# Patient Record
Sex: MALE | Race: WHITE | NOT HISPANIC OR LATINO | Employment: OTHER | URBAN - NONMETROPOLITAN AREA
[De-identification: names, ages, dates, MRNs, and addresses within clinical notes are randomized per-mention and may not be internally consistent; named-entity substitution may affect disease eponyms.]

---

## 2022-07-14 PROBLEM — I25.10 CORONARY ARTERY CALCIFICATION: Status: ACTIVE | Noted: 2022-07-14

## 2022-07-14 PROBLEM — I25.84 CORONARY ARTERY CALCIFICATION: Status: ACTIVE | Noted: 2022-07-14

## 2022-07-14 PROBLEM — E78.5 HYPERLIPIDEMIA LDL GOAL <100: Status: ACTIVE | Noted: 2022-07-14

## 2022-07-14 NOTE — PROGRESS NOTES
OFFICE VISIT  NOTE  [facility] CARDIOLOGY      Name: Christian Gonzales    Date: 2022  MRN:  [id number]  :  1940      REFERRING/PRIMARY PROVIDER:  Maximilian Gardiner MD    Chief Complaint   Patient presents with   • Severe Coronary Artery Calcification     Referred by Dr. Maximilian Gardiner       HPI: Christian Gonzales is a 82 y.o. male who presents today for new consultation for coronary calcification on CT chest.  CT chest ordered as a screening for cancers as the patient has dermatomyositis.  Patient sees Dr. Gardiner.  Most recent labs from 2022 reviewed, excellent cholesterol numbers.  He is quite active, still travels, exercises on a treadmill most days of the week with no chest pain or shortness of breath.  Only gets short of breath if he climbs more than 2-3 flights of stairs.    Past Medical History:   Diagnosis Date   • Arthritis    • Atypical migraine    • Basal cell carcinoma     Left arm   • Bursitis of shoulder, left    • Cataracts, bilateral    • Chronic anemia    • Dermatomyositis (HCC)    • Head trauma     x3 with loss of consciousness   • Hearing loss     High Frequency   • Hyperlipidemia    • Rib fracture    • Rotator cuff tear    • Small bowel obstruction (HCC)    • Wrist fracture     left       Past Surgical History:   Procedure Laterality Date   • BASAL CELL CARCINOMA EXCISION     • TONSILLECTOMY AND ADENOIDECTOMY         Social History     Socioeconomic History   • Marital status:    Tobacco Use   • Smoking status: Former Smoker     Quit date:      Years since quittin.5   • Smokeless tobacco: Never Used   Vaping Use   • Vaping Use: Never used   Substance and Sexual Activity   • Alcohol use: Yes     Comment: Occassional   • Drug use: Never   • Sexual activity: Defer       Family History   Problem Relation Age of Onset   • No Known Problems Mother    • No Known Problems Father         ROS:   Constitutional no fever,  no weight loss   Skin no rash, no subcutaneous  "nodules   Otolaryngeal no difficulty swallowing   Cardiovascular See HPI   Pulmonary no cough, no sputum production   Gastrointestinal no constipation, no diarrhea   Genitourinary no dysuria, no hematuria   Hematologic no easy bruisability, no abnormal bleeding   Musculoskeletal no muscle pain   Neurologic no dizziness, no falls         No Known Allergies      Current Outpatient Medications:   •  atorvastatin (LIPITOR) 20 MG tablet, Take 20 mg by mouth Daily., Disp: , Rfl:   •  Cholecalciferol (Vitamin D3) 50 MCG (2000 UT) tablet, Take 2,000 Units by mouth Daily., Disp: , Rfl:   •  coenzyme Q10 100 MG capsule, Take 100 mg by mouth Daily., Disp: , Rfl:   •  hydroxychloroquine (PLAQUENIL) 200 MG tablet, Take 200 mg by mouth Daily., Disp: , Rfl:   •  Lactobacillus (Probiotic Acidophilus) capsule, Take 1 capsule by mouth Daily., Disp: , Rfl:   •  zonisamide (ZONEGRAN) 50 MG capsule, Take 50 mg by mouth Daily., Disp: , Rfl:     Vitals:    07/18/22 1121   BP: 130/71   BP Location: Right arm   Patient Position: Sitting   Cuff Size: Adult   Pulse: 54   SpO2: 98%   Weight: 77 kg (169 lb 12.8 oz)   Height: 175.3 cm (69\")     Body mass index is 25.08 kg/m².    PHYSICAL EXAM:    General Appearance:   · well developed  · well nourished  HENT:   · oropharynx moist  · lips not cyanotic  Neck:  · thyroid not enlarged  · supple  Respiratory:  · no respiratory distress  · normal breath sounds  · no rales  Cardiovascular:  · no jugular venous distention  · regular rhythm  · apical impulse normal  · S1 normal, S2 normal  · no S3, no S4   · no murmur  · no rub, no thrill  · carotid pulses normal; no bruit  · lower extremity edema: none      Musculoskeletal:  · no clubbing of fingers.   · normocephalic, head atraumatic  Skin:   · warm, dry  Psychiatric:  · judgement and insight appropriate  · normal mood and affect    RESULTS:     ECG 12 Lead    Date/Time: 7/18/2022 11:50 AM  Performed by: Mando Smith MD  Authorized by: Luis, " Mando ORTIZ MD   Comparison: not compared with previous ECG   Previous ECG: no previous ECG available  Rhythm: sinus rhythm  Rate: normal  QRS axis: normal    Clinical impression: non-specific ECG  Comments: Nonspecific T wave flattening in 3 and aVF otherwise normal.                  Labs:  No results found for: CHOL, TRIG, HDL, LDL, AST, ALT  No results found for: HGBA1C  No components found for: CREATINININE  No results found for: EGFRIFNONA    Most recent PCP note, imaging tests, and labs reviewed.    ASSESSMENT:  Problem List Items Addressed This Visit        Cardiac and Vasculature    Coronary artery calcification - Primary    Overview     7/1/22 CT Chest: Severe coronary artery calcification           Hyperlipidemia LDL goal <100    Relevant Medications    atorvastatin (LIPITOR) 20 MG tablet          PLAN:    1.  Severe coronary calcification on CT chest:  Report personally reviewed, most recent labs personally reviewed, cholesterol numbers excellent  Given his lack of symptoms, specifically no exertional chest pain or shortness of breath, will continue monitoring for now.  No indication for ischemic evaluation given lack of symptoms.  Continue atorvastatin at current dose    2.  Hyperlipidemia:  Goal DL less than 100  Continue atorvastatin 20 mg daily most recent labs reviewed, stable.    3.  Dermatomyositis:  No symptoms of shortness of breath or cardiomyopathy, will hold off on echo for now but monitor for symptoms.    No data to support aspirin for primary prevention    Happy to see him back as needed if symptoms develop.    Advance Care Planning   ACP discussion was held with the patient during this visit. Patient does not have an advance directive, declines further assistance.         Thank you for the opportunity to share in the care of your patient; please do not hesitate to call me with any questions.     Mando Smith MD, [facility]  Office: [phone]  [facility]  [place] [address]    07/18/22

## 2022-07-18 ENCOUNTER — OFFICE VISIT (OUTPATIENT)
Dept: CARDIOLOGY | Facility: CLINIC | Age: 82
End: 2022-07-18

## 2022-07-18 VITALS
HEIGHT: 69 IN | SYSTOLIC BLOOD PRESSURE: 130 MMHG | DIASTOLIC BLOOD PRESSURE: 71 MMHG | BODY MASS INDEX: 25.15 KG/M2 | WEIGHT: 169.8 LBS | OXYGEN SATURATION: 98 % | HEART RATE: 54 BPM

## 2022-07-18 DIAGNOSIS — E78.5 HYPERLIPIDEMIA LDL GOAL <100: ICD-10-CM

## 2022-07-18 DIAGNOSIS — I25.84 CORONARY ARTERY CALCIFICATION: Primary | ICD-10-CM

## 2022-07-18 DIAGNOSIS — I25.10 CORONARY ARTERY CALCIFICATION: Primary | ICD-10-CM

## 2022-07-18 PROCEDURE — 93000 ELECTROCARDIOGRAM COMPLETE: CPT | Performed by: INTERNAL MEDICINE

## 2022-07-18 PROCEDURE — 99203 OFFICE O/P NEW LOW 30 MIN: CPT | Performed by: INTERNAL MEDICINE

## 2022-07-18 RX ORDER — ZONISAMIDE 50 MG/1
50 CAPSULE ORAL DAILY
COMMUNITY

## 2022-07-18 RX ORDER — HYDROXYCHLOROQUINE SULFATE 200 MG/1
200 TABLET, FILM COATED ORAL DAILY
COMMUNITY

## 2022-07-18 RX ORDER — CHOLECALCIFEROL (VITAMIN D3) 125 MCG
2000 CAPSULE ORAL DAILY
COMMUNITY

## 2022-07-18 RX ORDER — UBIDECARENONE 100 MG
100 CAPSULE ORAL DAILY
COMMUNITY

## 2022-07-18 RX ORDER — ATORVASTATIN CALCIUM 20 MG/1
20 TABLET, FILM COATED ORAL DAILY
COMMUNITY

## 2022-07-18 RX ORDER — CHOLECALCIFEROL (VITAMIN D3) 125 MCG
1 CAPSULE ORAL DAILY
COMMUNITY

## 2024-08-28 PROBLEM — M33.13 DERMATOMYOSITIS: Status: ACTIVE | Noted: 2024-08-28

## 2024-09-05 ENCOUNTER — OFFICE VISIT (OUTPATIENT)
Age: 84
End: 2024-09-05
Payer: MEDICARE

## 2024-09-05 VITALS
HEIGHT: 69 IN | BODY MASS INDEX: 23.52 KG/M2 | SYSTOLIC BLOOD PRESSURE: 150 MMHG | TEMPERATURE: 97.8 F | HEART RATE: 64 BPM | DIASTOLIC BLOOD PRESSURE: 84 MMHG | WEIGHT: 158.8 LBS

## 2024-09-05 DIAGNOSIS — R76.12 POSITIVE QUANTIFERON-TB GOLD TEST: ICD-10-CM

## 2024-09-05 DIAGNOSIS — M19.90 OSTEOARTHRITIS, UNSPECIFIED OSTEOARTHRITIS TYPE, UNSPECIFIED SITE: ICD-10-CM

## 2024-09-05 DIAGNOSIS — Z79.899 HIGH RISK MEDICATION USE: ICD-10-CM

## 2024-09-05 DIAGNOSIS — M19.042 PRIMARY OSTEOARTHRITIS OF BOTH HANDS: Primary | ICD-10-CM

## 2024-09-05 DIAGNOSIS — M19.041 PRIMARY OSTEOARTHRITIS OF BOTH HANDS: Primary | ICD-10-CM

## 2024-09-05 DIAGNOSIS — M33.13 DERMATOMYOSITIS: ICD-10-CM

## 2024-09-05 RX ORDER — DIPHENOXYLATE HCL/ATROPINE 2.5-.025MG
TABLET ORAL
COMMUNITY

## 2024-09-05 NOTE — PROGRESS NOTES
[facility] Rheumatology Office Follow Up Visit     Office Follow Up      Date: 09/05/2024   Patient Name: Christian Gonzales  MRN: [id number]  YOB: 1940    Referring Physician: Karyn Paul,*     Chief Complaint   Patient presents with    Joint Pain    Arthritis       History of Present Illness: Christian Gonzales is a 84 y.o. male who is here today for follow up on   History of Present Illness  The patient presents for evaluation of multiple medical concerns.    He reports overall good health. In April 2024, an attempt was made to increase his Plaquenil dosage to two tablets every other day, but this led to a decrease in his platelet count to around 100 and increased bruising. Consequently, the dosage was reduced to one tablet daily, which he tolerates well. He has discontinued the use of clobetasol and tacrolimus. His last lab work was conducted six months ago at [facility]. He reports no presence of rashes or weakness.    He experiences morning stiffness that lasts about 10 minutes and occasionally needs to straighten his lower back upon waking, depending on his sleeping position.    Three weeks ago, he began experiencing intermittent sharp pain at the base of his right thumb, occurring once a day and lasting a few minutes. The left thumb is already fused and has limited mobility. He rates the pain as a 6 out of 10. He is seeking advice on potential treatments.       Result Review :        Results  Laboratory Studies  Platelet count dropped to about 100.          Subjective     Allergies   Allergen Reactions    Terbinafine Hives and Rash    Apremilast Provider Review Needed         Current Outpatient Medications:     atorvastatin (LIPITOR) 20 MG tablet, Take 1 tablet by mouth Daily., Disp: , Rfl:     coenzyme Q10 100 MG capsule, Take 1 capsule by mouth Daily., Disp: , Rfl:     hydroxychloroquine (PLAQUENIL) 200 MG tablet, Take 1 tablet by mouth Daily., Disp: ,  Rfl:     Lomotil 2.5-0.025 MG per tablet, Take  by mouth., Disp: , Rfl:     Probiotic Product (PROBIOTIC PO), Take 1 tablet by mouth Daily., Disp: , Rfl:     zonisamide (ZONEGRAN) 50 MG capsule, Take 1 capsule by mouth Daily., Disp: , Rfl:     Cholecalciferol (Vitamin D3) 50 MCG (2000 UT) tablet, Take 2,000 Units by mouth Daily., Disp: , Rfl:     Lactobacillus (Probiotic Acidophilus) capsule, Take 1 capsule by mouth Daily., Disp: , Rfl:     Past Medical History:   Diagnosis Date    Arthritis     Atypical migraine     Basal cell carcinoma     Left arm    Bursitis of shoulder, left     Cataracts, bilateral     Chronic anemia     DDD (degenerative disc disease), cervical     DDD (degenerative disc disease), lumbar     Dermatomyositis     GERD (gastroesophageal reflux disease)     Head trauma     x3 with loss of consciousness    Headache, migraine     Hearing loss     High Frequency    Hyperlipidemia     Impotence     Mild anemia     Rib fracture     Rotator cuff tear     Seborrheic keratosis     LEFT SHOULDER    Small bowel obstruction     Wrist fracture     left        Past Surgical History:   Procedure Laterality Date    BASAL CELL CARCINOMA EXCISION      TONSILLECTOMY AND ADENOIDECTOMY         Family History   Problem Relation Age of Onset    Thyroid disease Mother     No Known Problems Father     Prostate cancer Brother     Atrial fibrillation Brother     Lesch-Nyhan syndrome Daughter     Psoriasis Daughter         Social History     Socioeconomic History    Marital status:    Tobacco Use    Smoking status: Former     Current packs/day: 0.00     Types: Cigarettes     Quit date:      Years since quittin.7     Passive exposure: Past    Smokeless tobacco: Never   Vaping Use    Vaping status: Never Used   Substance and Sexual Activity    Alcohol use: Yes     Comment: Occassional    Drug use: Never    Sexual activity: Defer       Review of Systems   Constitutional:  Positive for unexpected weight loss.  Negative for fatigue and fever.   HENT:  Negative for mouth sores, nosebleeds, swollen glands and trouble swallowing.    Eyes:  Negative for blurred vision, double vision, photophobia, pain and visual disturbance.   Respiratory:  Negative for apnea, cough, choking and shortness of breath.    Cardiovascular:  Negative for chest pain, palpitations and leg swelling.   Gastrointestinal:  Positive for diarrhea. Negative for abdominal pain, blood in stool, constipation, nausea, vomiting and GERD.   Endocrine: Negative for cold intolerance, heat intolerance, polydipsia, polyphagia and polyuria.        Gynecomastia    Genitourinary:  Negative for difficulty urinating, dysuria, genital sores, hematuria and urinary incontinence.   Musculoskeletal:  Negative for arthralgias, back pain, gait problem, joint swelling, myalgias, neck pain, neck stiffness and bursitis.        Joint pain     Skin:  Positive for bruise. Negative for rash.   Allergic/Immunologic: Negative for environmental allergies and food allergies.   Neurological:  Positive for dizziness. Negative for tremors, seizures, syncope, weakness, numbness, headache, memory problem and confusion.        Tingling    Hematological:  Negative for adenopathy. Bruises/bleeds easily.   Psychiatric/Behavioral:  Negative for sleep disturbance, suicidal ideas, depressed mood and stress. The patient is not nervous/anxious.         I have reviewed and updated the patient's chief complaint, history of present illness, review of systems, past medical history, surgical history, family history, social history, medications and allergy list as appropriate.     Objective    Vital Signs:   There were no vitals filed for this visit.  Christian Gonzales reports a pain score of .  Given his pain assessment as noted, treatment options were discussed and the following options were decided upon as a follow-up plan to address the patient's pain: .      There is no height or weight on file to calculate  BMI.  BMI cannot be calculated due to outdated height or weight values.  Please input a current height/weight in Vitals and re-renter BMIFOLLOWUP in Note to pull in correct documentation based on BMI range.      Physical Exam   Physical Exam  Patient is alert, in no acute distress.  Head is atraumatic and normocephalic. Pupils are equal and round. Extraocular muscles are intact.  Lungs are clear.  Heart rhythm is regular without rubs, gallops, or murmurs.  Thoracic and lumbar spine are nontender. Cervical range of motion is good. Right hand shows significant squaring of the first CMC joint and significant subluxation of the right thumb. No synovitis. Left hand appears to have the same visual appearance with CMC squaring, thumb subluxation. No synovitis. No synovitis or tenderness of the knees, ankles, MTPs. Motor strength is 5/5 throughout.  Dermatomyositis rash is negative. Evidence of previous sun damage is present.    Physical Exam  There is currently no information documented on the homunculus. Go to the Rheumatology activity and [facility].     Results Review:   Imaging Results (Last 24 Hours)       ** No results found for the last 24 hours. **            Procedures    Assessment / Plan    Assessment/Plan:   There are no diagnoses linked to this encounter.      Assessment & Plan  1. Osteoarthritis of the CMC joints bilaterally.  He reports a shooting pain in the right thumb base, occurring once a day if moved incorrectly. There is significant squaring and subluxation bilaterally. He can move the CMC joint on the right but not on the left. The pain is not severe or persistent enough to warrant medication. Topical creams are unlikely to prevent the episodic and brief pain. Recommended using comfort cool splints from [employer] to hold the thumb in an appropriate position. X-rays of the hands will be obtained to assess the condition further. If symptoms persist, a referral to an orthopedic hand  surgeon will be considered for potential custom splint recommendations.    2. Dermatomyositis.  Diagnosed by biopsy of a rash. He started Plaquenil in May 2022. Currently, he reports no muscle weakness or shortness of breath, and his rash has resolved. His dermatologist, Dr. Paul, has been tapering his Plaquenil. He was alternating one with two tablets daily, but his platelets dropped during the taper. He is now on 1 tablet daily and doing well. If there is a recurrence of rash or other symptoms, further workup or treatment will be coordinated with Dr. Paul.    3. 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  Repeat test was negative, and a CT scan was also negative for TB. He has had no symptoms and a negative prior skin test, suggesting the initial positive result was likely a false positive. Plaquenil is well tolerated and effective. He should have an eye exam every 12 months with an annual OCT. He gets a CBC and CMP every 6 months. In 2023, his platelets were 148,000. Recent labs from [facility] will be requested for the chart.          Follow Up:   No follow-ups on file.     Su Kong MD  [facility]     Encounter Administratively Closed by Health Information Management

## 2024-10-09 PROBLEM — Z79.899 HIGH RISK MEDICATION USE: Status: ACTIVE | Noted: 2024-10-09

## 2024-10-09 PROBLEM — M19.90 OSTEOARTHRITIS: Status: ACTIVE | Noted: 2024-10-09

## 2024-10-09 PROBLEM — M19.041 PRIMARY OSTEOARTHRITIS OF BOTH HANDS: Status: ACTIVE | Noted: 2024-10-09

## 2024-10-09 PROBLEM — M19.042 PRIMARY OSTEOARTHRITIS OF BOTH HANDS: Status: ACTIVE | Noted: 2024-10-09

## 2024-10-09 PROBLEM — R76.12 POSITIVE QUANTIFERON-TB GOLD TEST: Status: ACTIVE | Noted: 2024-10-09
